# Patient Record
Sex: MALE | Race: WHITE | NOT HISPANIC OR LATINO | URBAN - METROPOLITAN AREA
[De-identification: names, ages, dates, MRNs, and addresses within clinical notes are randomized per-mention and may not be internally consistent; named-entity substitution may affect disease eponyms.]

---

## 2022-12-09 ENCOUNTER — TELEPHONE ENCOUNTER (OUTPATIENT)
Dept: URBAN - METROPOLITAN AREA CLINIC 27 | Facility: CLINIC | Age: 37
End: 2022-12-09

## 2022-12-09 ENCOUNTER — OFFICE VISIT (OUTPATIENT)
Dept: URBAN - METROPOLITAN AREA CLINIC 27 | Facility: CLINIC | Age: 37
End: 2022-12-09
Payer: COMMERCIAL

## 2022-12-09 ENCOUNTER — WEB ENCOUNTER (OUTPATIENT)
Dept: URBAN - METROPOLITAN AREA CLINIC 27 | Facility: CLINIC | Age: 37
End: 2022-12-09

## 2022-12-09 VITALS
BODY MASS INDEX: 22.35 KG/M2 | WEIGHT: 165 LBS | SYSTOLIC BLOOD PRESSURE: 131 MMHG | HEIGHT: 72 IN | DIASTOLIC BLOOD PRESSURE: 81 MMHG | RESPIRATION RATE: 17 BRPM | HEART RATE: 81 BPM

## 2022-12-09 DIAGNOSIS — K92.1 HEMATOCHEZIA: ICD-10-CM

## 2022-12-09 PROCEDURE — 99204 OFFICE O/P NEW MOD 45 MIN: CPT | Performed by: INTERNAL MEDICINE

## 2022-12-09 RX ORDER — SODIUM, POTASSIUM,MAG SULFATES 17.5-3.13G
354 ML SOLUTION, RECONSTITUTED, ORAL ORAL
OUTPATIENT
Start: 2022-12-09

## 2022-12-09 RX ORDER — SODIUM, POTASSIUM,MAG SULFATES 17.5-3.13G
354 ML AS DIRECTED FOR COLONOSCOPY SOLUTION, RECONSTITUTED, ORAL ORAL ONCE
Qty: 354 ML | Refills: 0
Start: 2022-12-09 | End: 2022-12-10

## 2022-12-09 NOTE — HPI-TODAY'S VISIT:
Mr. Stone is a 37-year old who presents with rectal bleeding.  He states that he has had rectal bleeding for 1 month.  He states that has about 10 loose stools a day with bright red blood associated.   He has nocturnal symptoms, having t wake up 3 times at night.  He has urgency and intermittent cramping.  He does not have pain with food.  He has no fevers or chills.   He has no family history of diarrheal illnesses.

## 2022-12-12 ENCOUNTER — WEB ENCOUNTER (OUTPATIENT)
Dept: URBAN - METROPOLITAN AREA CLINIC 27 | Facility: CLINIC | Age: 37
End: 2022-12-12

## 2022-12-13 ENCOUNTER — WEB ENCOUNTER (OUTPATIENT)
Dept: URBAN - METROPOLITAN AREA SURGERY CENTER 7 | Facility: SURGERY CENTER | Age: 37
End: 2022-12-13

## 2022-12-15 ENCOUNTER — CLAIMS CREATED FROM THE CLAIM WINDOW (OUTPATIENT)
Dept: URBAN - METROPOLITAN AREA SURGERY CENTER 7 | Facility: SURGERY CENTER | Age: 37
End: 2022-12-15
Payer: COMMERCIAL

## 2022-12-15 ENCOUNTER — CLAIMS CREATED FROM THE CLAIM WINDOW (OUTPATIENT)
Dept: URBAN - METROPOLITAN AREA SURGERY CENTER 7 | Facility: SURGERY CENTER | Age: 37
End: 2022-12-15

## 2022-12-15 ENCOUNTER — TELEPHONE ENCOUNTER (OUTPATIENT)
Dept: URBAN - METROPOLITAN AREA CLINIC 27 | Facility: CLINIC | Age: 37
End: 2022-12-15

## 2022-12-15 ENCOUNTER — LAB OUTSIDE AN ENCOUNTER (OUTPATIENT)
Dept: URBAN - METROPOLITAN AREA CLINIC 27 | Facility: CLINIC | Age: 37
End: 2022-12-15

## 2022-12-15 ENCOUNTER — CLAIMS CREATED FROM THE CLAIM WINDOW (OUTPATIENT)
Dept: URBAN - METROPOLITAN AREA CLINIC 4 | Facility: CLINIC | Age: 37
End: 2022-12-15
Payer: COMMERCIAL

## 2022-12-15 DIAGNOSIS — K63.89 BACTERIAL OVERGROWTH SYNDROME: ICD-10-CM

## 2022-12-15 DIAGNOSIS — K52.3 COLITIS, INDETERMINATE: ICD-10-CM

## 2022-12-15 DIAGNOSIS — K63.89 OTHER SPECIFIED DISEASES OF INTESTINE: ICD-10-CM

## 2022-12-15 DIAGNOSIS — K62.5 ANAL BLEEDING: ICD-10-CM

## 2022-12-15 DIAGNOSIS — R19.7 ACUTE DIARRHEA: ICD-10-CM

## 2022-12-15 PROCEDURE — 45380 COLONOSCOPY AND BIOPSY: CPT | Performed by: INTERNAL MEDICINE

## 2022-12-15 PROCEDURE — 88305 TISSUE EXAM BY PATHOLOGIST: CPT | Performed by: PATHOLOGY

## 2022-12-15 PROCEDURE — G8907 PT DOC NO EVENTS ON DISCHARG: HCPCS | Performed by: INTERNAL MEDICINE

## 2022-12-15 RX ORDER — PREDNISONE 10 MG/1
4 TABLETS FOR 7 DAYS, THEN 3 TABLETS FOR 7 DAYS, THEN 2 TABLETS FOR 7 DAYS, THEN 1 TABLET FOR 7 DAYS, THEN 1/2 HALF FOR 7 DAYS TABLET ORAL ONCE A DAY
Qty: 74 | Refills: 0 | OUTPATIENT
Start: 2022-12-15 | End: 2023-01-19

## 2022-12-16 ENCOUNTER — LAB OUTSIDE AN ENCOUNTER (OUTPATIENT)
Dept: URBAN - METROPOLITAN AREA CLINIC 27 | Facility: CLINIC | Age: 37
End: 2022-12-16

## 2022-12-18 LAB
COMMENT: (no result)
EXTRA LAVENDER-TOP TUBE: (no result)

## 2022-12-22 LAB
ANCA SCREEN: NEGATIVE
MYELOPEROXIDASE ANTIBODY: <1
PROTEINASE-3 ANTIBODY: <1
SACCHAROMYCES CEREVISIAE AB (ASCA) (IGA): 7.2
SACCHAROMYCES CEREVISIAE AB (ASCA) (IGG): 5.1

## 2022-12-29 LAB
HEPATITIS B CORE AB TOTAL: (no result)
HEPATITIS B SURFACE ANTIBODY QL: REACTIVE
HEPATITIS B SURFACE ANTIGEN: (no result)
MITOGEN-NIL: >10
NIL: 0.03
QUANTIFERON(R)-TB GOLD: NEGATIVE
TB1-NIL: 0.01
TB2-NIL: 0

## 2023-02-06 ENCOUNTER — OFFICE VISIT (OUTPATIENT)
Dept: URBAN - METROPOLITAN AREA CLINIC 27 | Facility: CLINIC | Age: 38
End: 2023-02-06
Payer: COMMERCIAL

## 2023-02-06 VITALS
WEIGHT: 182 LBS | HEART RATE: 81 BPM | SYSTOLIC BLOOD PRESSURE: 133 MMHG | HEIGHT: 72 IN | BODY MASS INDEX: 24.65 KG/M2 | DIASTOLIC BLOOD PRESSURE: 95 MMHG

## 2023-02-06 DIAGNOSIS — K50.10 CROHN'S DISEASE OF LARGE INTESTINE WITHOUT COMPLICATION: ICD-10-CM

## 2023-02-06 PROCEDURE — 99213 OFFICE O/P EST LOW 20 MIN: CPT | Performed by: INTERNAL MEDICINE

## 2023-02-06 RX ORDER — MESALAMINE 1.2 G/1
2 TABLETS WITH A MEAL TABLET, DELAYED RELEASE ORAL ONCE A DAY
Qty: 60 | OUTPATIENT
Start: 2023-02-06 | End: 2023-03-08

## 2023-02-06 NOTE — HPI-TODAY'S VISIT:
Bertha Ferguson  is a 37-year-old man who presents with bloody diarrhea was found to have severe colitis consistent with crohn's here for follow up.  He finished his prednisone taper 4 weeks ago and responded well.  He doesn't have diarrhea currently has 12 formed stool a day non-bloody.  Improved from 10 or more.  He states that he has noticed canker sores and arthritis returning.

## 2023-02-13 ENCOUNTER — WEB ENCOUNTER (OUTPATIENT)
Dept: URBAN - METROPOLITAN AREA CLINIC 27 | Facility: CLINIC | Age: 38
End: 2023-02-13

## 2023-02-16 PROBLEM — 7620006: Status: ACTIVE | Noted: 2023-02-06

## 2023-02-28 ENCOUNTER — TELEPHONE ENCOUNTER (OUTPATIENT)
Dept: URBAN - METROPOLITAN AREA CLINIC 98 | Facility: CLINIC | Age: 38
End: 2023-02-28

## 2023-03-06 ENCOUNTER — TELEPHONE ENCOUNTER (OUTPATIENT)
Dept: URBAN - METROPOLITAN AREA CLINIC 27 | Facility: CLINIC | Age: 38
End: 2023-03-06

## 2023-03-06 ENCOUNTER — OFFICE VISIT (OUTPATIENT)
Dept: URBAN - METROPOLITAN AREA CLINIC 96 | Facility: CLINIC | Age: 38
End: 2023-03-06
Payer: COMMERCIAL

## 2023-03-06 VITALS
BODY MASS INDEX: 24.43 KG/M2 | DIASTOLIC BLOOD PRESSURE: 89 MMHG | WEIGHT: 180.4 LBS | TEMPERATURE: 96.2 F | SYSTOLIC BLOOD PRESSURE: 132 MMHG | HEIGHT: 72 IN

## 2023-03-06 DIAGNOSIS — K12.0 CANKER SORE: ICD-10-CM

## 2023-03-06 DIAGNOSIS — K52.9 INFLAMMATORY BOWEL DISEASE: ICD-10-CM

## 2023-03-06 DIAGNOSIS — M25.50 POLYARTHRALGIA: ICD-10-CM

## 2023-03-06 DIAGNOSIS — Z51.81 THERAPEUTIC DRUG MONITORING: ICD-10-CM

## 2023-03-06 PROCEDURE — 99215 OFFICE O/P EST HI 40 MIN: CPT | Performed by: INTERNAL MEDICINE

## 2023-03-06 RX ORDER — MESALAMINE 1.2 G/1
2 TABLETS WITH A MEAL TABLET, DELAYED RELEASE ORAL ONCE A DAY
Qty: 60 | Refills: 11
Start: 2023-02-06 | End: 2023-04-06

## 2023-03-06 NOTE — HPI-TODAY'S VISIT:
37 y.o. WM Here for 2nd opinion Last June -- ankle, knee, shoulder pains -- all started at once Knee MD and podiatriast couldn't figure it out End of Nov - started w/ bloody diarrhea for -- few times at night, 10 times in the day - saw Dr. Celis - very helpful Did c-scope next week after seeing her in office Started on Pred x 1 month - taper - felt really good Ankle and knee pain is back; lots of sores in mouth Foster rec Humira or Chi Wonders about medicine Generally bloated - always been a thing Blood in stool is gone Going now just 2 / day

## 2023-03-08 ENCOUNTER — LAB OUTSIDE AN ENCOUNTER (OUTPATIENT)
Dept: URBAN - METROPOLITAN AREA CLINIC 96 | Facility: CLINIC | Age: 38
End: 2023-03-08

## 2023-03-08 ENCOUNTER — WEB ENCOUNTER (OUTPATIENT)
Dept: URBAN - METROPOLITAN AREA CLINIC 92 | Facility: CLINIC | Age: 38
End: 2023-03-08

## 2023-03-08 LAB
CRP: 0.43
HEP B SURF AG: (no result)
PERFORMING LAB: (no result)
PRICE - MISC: (no result)

## 2023-03-08 RX ORDER — MESALAMINE 1.2 G/1
2 TABLETS WITH A MEAL TABLET, DELAYED RELEASE ORAL ONCE A DAY
Qty: 60 | Refills: 1

## 2023-03-10 ENCOUNTER — WEB ENCOUNTER (OUTPATIENT)
Dept: URBAN - METROPOLITAN AREA CLINIC 92 | Facility: CLINIC | Age: 38
End: 2023-03-10

## 2023-03-10 LAB
Lab: (no result)
PERFORMING LAB: (no result)

## 2023-03-10 RX ORDER — PREDNISONE 20 MG/1
2 TABLETS WITH FOOD OR MILK TABLET ORAL ONCE A DAY
Qty: 10 TABLET | Refills: 0 | OUTPATIENT

## 2023-03-12 LAB
PERFORMING LAB: (no result)
QUANTIFERON-TB PLUS, 1T: (no result)

## 2023-03-13 ENCOUNTER — TELEPHONE ENCOUNTER (OUTPATIENT)
Dept: URBAN - METROPOLITAN AREA CLINIC 98 | Facility: CLINIC | Age: 38
End: 2023-03-13

## 2023-03-13 ENCOUNTER — WEB ENCOUNTER (OUTPATIENT)
Dept: URBAN - METROPOLITAN AREA CLINIC 35 | Facility: CLINIC | Age: 38
End: 2023-03-13

## 2023-03-13 PROBLEM — 71833008: Status: ACTIVE | Noted: 2023-03-13

## 2023-03-13 RX ORDER — ADALIMUMAB 40MG/0.4ML
0.4 ML KIT SUBCUTANEOUS EVERY OTHER WEEK
Qty: 6 PEN NEEDLE | Refills: 3 | OUTPATIENT
Start: 2023-03-13 | End: 2024-02-11

## 2023-03-13 RX ORDER — PREDNISONE 20 MG/1
2 TABLETS WITH FOOD OR MILK TABLET ORAL ONCE A DAY
Qty: 10 TABLET | Refills: 0 | Status: ACTIVE | COMMUNITY

## 2023-03-13 RX ORDER — MESALAMINE 1.2 G/1
2 TABLETS WITH A MEAL TABLET, DELAYED RELEASE ORAL ONCE A DAY
Qty: 60 | Refills: 1 | Status: ACTIVE | COMMUNITY

## 2023-03-13 RX ORDER — ADALIMUMAB 80MG/0.8ML
STARTER KIT, AS DIRECTED KIT SUBCUTANEOUS
Qty: 3 | Refills: 0 | OUTPATIENT
Start: 2023-03-13

## 2023-03-15 ENCOUNTER — TELEPHONE ENCOUNTER (OUTPATIENT)
Dept: URBAN - METROPOLITAN AREA CLINIC 35 | Facility: CLINIC | Age: 38
End: 2023-03-15

## 2023-03-17 ENCOUNTER — TELEPHONE ENCOUNTER (OUTPATIENT)
Dept: URBAN - METROPOLITAN AREA CLINIC 29 | Facility: CLINIC | Age: 38
End: 2023-03-17

## 2023-03-28 ENCOUNTER — TELEPHONE ENCOUNTER (OUTPATIENT)
Dept: URBAN - METROPOLITAN AREA CLINIC 29 | Facility: CLINIC | Age: 38
End: 2023-03-28

## 2023-04-06 ENCOUNTER — WEB ENCOUNTER (OUTPATIENT)
Dept: URBAN - METROPOLITAN AREA CLINIC 35 | Facility: CLINIC | Age: 38
End: 2023-04-06

## 2023-04-06 RX ORDER — ADALIMUMAB 40MG/0.4ML
AS DIRECTED KIT SUBCUTANEOUS EVERY OTHER WEEK
Qty: 6 | Refills: 3 | OUTPATIENT
Start: 2023-04-06

## 2024-02-01 ENCOUNTER — LAB (OUTPATIENT)
Dept: URBAN - METROPOLITAN AREA CLINIC 50 | Facility: CLINIC | Age: 39
End: 2024-02-01

## 2024-02-01 ENCOUNTER — OV EP (OUTPATIENT)
Dept: URBAN - METROPOLITAN AREA CLINIC 50 | Facility: CLINIC | Age: 39
End: 2024-02-01
Payer: COMMERCIAL

## 2024-02-01 VITALS
DIASTOLIC BLOOD PRESSURE: 83 MMHG | BODY MASS INDEX: 23.7 KG/M2 | WEIGHT: 175 LBS | TEMPERATURE: 99.2 F | SYSTOLIC BLOOD PRESSURE: 122 MMHG | HEIGHT: 72 IN | HEART RATE: 84 BPM

## 2024-02-01 DIAGNOSIS — D84.9 IMMUNOSUPPRESSION: ICD-10-CM

## 2024-02-01 DIAGNOSIS — R19.7 ACUTE DIARRHEA: ICD-10-CM

## 2024-02-01 DIAGNOSIS — Z51.81 THERAPEUTIC DRUG MONITORING: ICD-10-CM

## 2024-02-01 DIAGNOSIS — K50.10 CROHN'S DISEASE OF LARGE INTESTINE WITHOUT COMPLICATION: ICD-10-CM

## 2024-02-01 PROBLEM — 305058001: Status: ACTIVE | Noted: 2024-02-01

## 2024-02-01 PROBLEM — 38013005: Status: ACTIVE | Noted: 2024-02-01

## 2024-02-01 PROBLEM — 62315008: Status: ACTIVE | Noted: 2024-02-01

## 2024-02-01 PROBLEM — 113521000119108: Status: ACTIVE | Noted: 2024-02-01

## 2024-02-01 PROBLEM — 24526004: Status: ACTIVE | Noted: 2024-02-01

## 2024-02-01 PROCEDURE — 99214 OFFICE O/P EST MOD 30 MIN: CPT | Performed by: PHYSICIAN ASSISTANT

## 2024-02-01 RX ORDER — BUDESONIDE 9 MG/1
1 TABLET IN THE MORNING TABLET, FILM COATED, EXTENDED RELEASE ORAL ONCE A DAY
Qty: 30 | Refills: 1 | OUTPATIENT
Start: 2024-02-01 | End: 2024-04-01

## 2024-02-01 RX ORDER — MESALAMINE 1.2 G/1
2 TABLETS WITH A MEAL TABLET, DELAYED RELEASE ORAL ONCE A DAY
Qty: 60 | Refills: 1 | Status: DISCONTINUED | COMMUNITY

## 2024-02-01 RX ORDER — PREDNISONE 20 MG/1
2 TABLETS TABLET ORAL ONCE A DAY
Qty: 10 TABLET | Refills: 0 | OUTPATIENT
Start: 2024-02-01 | End: 2024-02-06

## 2024-02-01 RX ORDER — ADALIMUMAB 40MG/0.4ML
0.4 ML KIT SUBCUTANEOUS EVERY OTHER WEEK
Qty: 6 PEN NEEDLE | Refills: 3 | Status: ACTIVE | COMMUNITY

## 2024-02-01 RX ORDER — PREDNISONE 20 MG/1
2 TABLETS WITH FOOD OR MILK TABLET ORAL ONCE A DAY
Qty: 10 TABLET | Refills: 0 | Status: DISCONTINUED | COMMUNITY

## 2024-02-01 RX ORDER — SODIUM, POTASSIUM,MAG SULFATES 17.5-3.13G
177ML SOLUTION, RECONSTITUTED, ORAL ORAL
Qty: 1 | Refills: 0 | OUTPATIENT
Start: 2024-02-01 | End: 2024-02-02

## 2024-02-01 NOTE — HPI-TODAY'S VISIT:
2/1/24: 39 y/o M here for f/u IBD, on humira States about 1-2 months ago, started w increasing joint/body aches, stomach discomfort/cramping, worse in past week, nausea Starting monday, diarrhea, no blood presently, BMs 6-10x/day this week, worse at nighttime Appetite lower last couple days, nausea, maybe indigestion, hard to describe No weight loss noted BMs were about 1-2x/day prior to onset of syx, no joint pains/rashes Continues use of humira, 40mg [id number], not missing any doses lately No recent abx use INcreasing body achiness/joint pains, itching withotu rash on UE -- 3/6/23: 37 y.o. WM Here for 2nd opinion Last June -- ankle, knee, shoulder pains -- all started at once Knee MD and podiatriast couldn't figure it out End of Nov - started w/ bloody diarrhea for -- few times at night, 10 times in the day - saw Dr. Celis - very helpful Did c-scope next week after seeing her in office Started on Pred x 1 month - taper - felt really good Ankle and knee pain is back; lots of sores in mouth Foster rec Humira or Chi Wonders about medicine Generally bloated - always been a thing Blood in stool is gone Going now just 2 / day

## 2024-02-01 NOTE — PHYSICAL EXAM GASTROINTESTINAL
Abdomen , soft, generalized mild abd tender, mildly distended , no guarding or rigidity , no masses palpable , normal bowel sounds , Liver and Spleen,  no hepatosplenomegaly , liver nontender.

## 2024-09-10 ENCOUNTER — WEB ENCOUNTER (OUTPATIENT)
Dept: URBAN - METROPOLITAN AREA CLINIC 96 | Facility: CLINIC | Age: 39
End: 2024-09-10

## 2024-12-30 ENCOUNTER — WEB ENCOUNTER (OUTPATIENT)
Dept: URBAN - METROPOLITAN AREA CLINIC 96 | Facility: CLINIC | Age: 39
End: 2024-12-30

## 2024-12-30 RX ORDER — DICYCLOMINE HYDROCHLORIDE 10 MG/1
1 CAPSULES CAPSULE ORAL
Qty: 30 | Refills: 1 | OUTPATIENT
Start: 2024-12-30 | End: 2025-02-28

## 2024-12-30 RX ORDER — PREDNISONE 20 MG/1
1 TABLET TABLET ORAL ONCE A DAY
Qty: 5 TABLET | Refills: 0 | OUTPATIENT
Start: 2024-12-30 | End: 2025-01-04

## 2025-01-20 ENCOUNTER — WEB ENCOUNTER (OUTPATIENT)
Dept: URBAN - METROPOLITAN AREA CLINIC 96 | Facility: CLINIC | Age: 40
End: 2025-01-20

## 2025-01-21 ENCOUNTER — DASHBOARD ENCOUNTERS (OUTPATIENT)
Age: 40
End: 2025-01-21

## 2025-01-21 ENCOUNTER — WEB ENCOUNTER (OUTPATIENT)
Dept: URBAN - METROPOLITAN AREA CLINIC 96 | Facility: CLINIC | Age: 40
End: 2025-01-21

## 2025-01-21 ENCOUNTER — TELEPHONE ENCOUNTER (OUTPATIENT)
Dept: URBAN - METROPOLITAN AREA CLINIC 50 | Facility: CLINIC | Age: 40
End: 2025-01-21

## 2025-01-21 ENCOUNTER — OFFICE VISIT (OUTPATIENT)
Dept: URBAN - METROPOLITAN AREA TELEHEALTH 2 | Facility: TELEHEALTH | Age: 40
End: 2025-01-21
Payer: COMMERCIAL

## 2025-01-21 ENCOUNTER — LAB OUTSIDE AN ENCOUNTER (OUTPATIENT)
Dept: URBAN - METROPOLITAN AREA TELEHEALTH 2 | Facility: TELEHEALTH | Age: 40
End: 2025-01-21

## 2025-01-21 VITALS — WEIGHT: 170 LBS | BODY MASS INDEX: 23.03 KG/M2 | HEIGHT: 72 IN

## 2025-01-21 DIAGNOSIS — Z79.620 ADALIMUMAB LONG-TERM USE: ICD-10-CM

## 2025-01-21 DIAGNOSIS — R82.90 URINE ABNORMALITY: ICD-10-CM

## 2025-01-21 DIAGNOSIS — R10.84 GENERALIZED ABDOMINAL CRAMPING: ICD-10-CM

## 2025-01-21 DIAGNOSIS — K62.89 RECTAL PAIN: ICD-10-CM

## 2025-01-21 DIAGNOSIS — R39.198 ABNORMAL URINATION: ICD-10-CM

## 2025-01-21 DIAGNOSIS — Z51.81 THERAPEUTIC DRUG MONITORING: ICD-10-CM

## 2025-01-21 DIAGNOSIS — R19.5 MUCUS IN STOOL: ICD-10-CM

## 2025-01-21 DIAGNOSIS — K50.10 CROHN'S DISEASE OF LARGE INTESTINE WITHOUT COMPLICATION: ICD-10-CM

## 2025-01-21 PROCEDURE — 99214 OFFICE O/P EST MOD 30 MIN: CPT | Performed by: PHYSICIAN ASSISTANT

## 2025-01-21 RX ORDER — ADALIMUMAB 40MG/0.4ML
INJECT 40 MG KIT SUBCUTANEOUS EVERY OTHER WEEK
Qty: 6 PEN NEEDLE | Refills: 3 | Status: ACTIVE | COMMUNITY

## 2025-01-21 RX ORDER — DICYCLOMINE HYDROCHLORIDE 10 MG/1
1 CAPSULES CAPSULE ORAL
Qty: 30 | Refills: 1 | Status: ACTIVE | COMMUNITY

## 2025-01-21 NOTE — HPI-TODAY'S VISIT:
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 Provided percocent, dilaudid, phenergan to help, also given dose solu-medrol States every 2-3 wks for past 6 months, will wake up from sleep w "constricted" feeling in rectum throughout abdomen, feeling like can't quite urinate or defecate with cramping/nausea Recent syx felt very simliar but way worse than typical and lasted longer w intolerable pains Still w some abd pains today, no nauesa Felt better last night generally about an hour after steroid administration BMs 1-2x/day, will get incr mucus w syx episodes including last day or so Appetite generally good, but poor appetite w pains, no abnormal wt loss No joint pains or rashes Continues humira q2wks, no skipped doses lately Admits hx kidney stones, but pains feel different from this Will feel urine urgency w fecal urge w syx as described above w episodes Sent home w prednisone, tramdol, zofran, hasn't started yet Not sure if antispasm much helpfu at htis point -- 2/1/24: 37 y/o M here for f/u IBD, on humira States about 1-2 months ago, started w increasing joint/body aches, stomach discomfort/cramping, worse in past week, nausea Starting monday, diarrhea, no blood presently, BMs 6-10x/day this week, worse at nighttime Appetite lower last couple days, nausea, maybe indigestion, hard to describe No weight loss noted BMs were about 1-2x/day prior to onset of syx, no joint pains/rashes Continues use of humira, 40mg [id number], not missing any doses lately No recent abx use INcreasing body achiness/joint pains, itching withotu rash on UE -- 3/6/23: 37 y.o. WM Here for 2nd opinion Last June -- ankle, knee, shoulder pains -- all started at once Knee MD and podiatriast couldn't figure it out End of Nov - started w/ bloody diarrhea for -- few times at night, 10 times in the day - saw Dr. Celis - very helpful Did c-scope next week after seeing her in office Started on Pred x 1 month - taper - felt really good Ankle and knee pain is back; lots of sores in mouth Foster rec Humira or Chi Wonders about medicine Generally bloated - always been a thing Blood in stool is gone Going now just 2 / day

## 2025-01-22 ENCOUNTER — TELEPHONE ENCOUNTER (OUTPATIENT)
Dept: URBAN - METROPOLITAN AREA CLINIC 50 | Facility: CLINIC | Age: 40
End: 2025-01-22

## 2025-01-22 ENCOUNTER — WEB ENCOUNTER (OUTPATIENT)
Dept: URBAN - METROPOLITAN AREA CLINIC 50 | Facility: CLINIC | Age: 40
End: 2025-01-22

## 2025-01-27 ENCOUNTER — OFFICE VISIT (OUTPATIENT)
Dept: URBAN - METROPOLITAN AREA CLINIC 96 | Facility: CLINIC | Age: 40
End: 2025-01-27

## 2025-02-03 ENCOUNTER — LAB OUTSIDE AN ENCOUNTER (OUTPATIENT)
Dept: URBAN - METROPOLITAN AREA CLINIC 50 | Facility: CLINIC | Age: 40
End: 2025-02-03

## 2025-02-04 ENCOUNTER — WEB ENCOUNTER (OUTPATIENT)
Dept: URBAN - METROPOLITAN AREA CLINIC 50 | Facility: CLINIC | Age: 40
End: 2025-02-04

## 2025-02-07 LAB
A/G RATIO: 2.1
ABSOLUTE BASOPHILS: 41
ABSOLUTE EOSINOPHILS: 73
ABSOLUTE LYMPHOCYTES: 2535
ABSOLUTE MONOCYTES: 437
ABSOLUTE NEUTROPHILS: 5014
ADALIMUMAB AB, IBD: <10
ADALIMUMAB LEVEL, IBD: 15.5
ALBUMIN: 4.9
ALKALINE PHOSPHATASE: 48
ALT (SGPT): 22
APPEARANCE: CLEAR
AST (SGOT): 17
BACTERIA: (no result)
BASOPHILS: 0.5
BILIRUBIN, TOTAL: 1.9
BILIRUBIN: NEGATIVE
BUN/CREATININE RATIO: (no result)
BUN: 17
C-REACTIVE PROTEIN, QUANT: <3
CALCIUM: 10.1
CARBON DIOXIDE, TOTAL: 27
CHLORIDE: 102
CHOL/HDLC RATIO: 4.3
CHOLESTEROL, TOTAL: 240
COMMENT: (no result)
COMMENTS: (no result)
CREATININE: 1.14
CULTURE: (no result)
EGFR: 84
EOSINOPHILS: 0.9
FERRITIN, SERUM: 118
FOLATE, SERUM: 19.5
GLOBULIN, TOTAL: 2.3
GLUCOSE: 85
GLUCOSE: NEGATIVE
HBSAG SCREEN: (no result)
HDL CHOLESTEROL: 56
HEMATOCRIT: 41.9
HEMOGLOBIN: 14.6
HEP A AB, IGM: (no result)
HEP B CORE AB, IGM: (no result)
HEPATITIS C ANTIBODY: (no result)
HYALINE CAST: (no result)
INTERPRETATION: (no result)
KETONES: NEGATIVE
LDL-CHOLESTEROL: 149
LYMPHOCYTES: 31.3
MCH: 30.9
MCHC: 34.8
MCV: 88.8
MITOGEN-NIL: >10
MONOCYTES: 5.4
MPV: 10.4
NEUTROPHILS: 61.9
NITRITE, URINE: NEGATIVE
NON HDL CHOLESTEROL: 184
OCCULT BLOOD: NEGATIVE
PH: 5.5
PLATELET COUNT: 260
POTASSIUM: 3.9
PROTEIN, TOTAL: 7.2
PROTEIN: (no result)
QUANTIFERON NIL VALUE: 0.02
QUANTIFERON TB1 AG VALUE: <0
QUANTIFERON TB2 AG VALUE: 0
QUANTIFERON-TB GOLD PLUS: NEGATIVE
RBC: (no result)
RDW: 12.1
RED BLOOD CELL COUNT: 4.72
SODIUM: 139
SPECIFIC GRAVITY: 1.03
SQUAMOUS EPITHELIAL CELLS: (no result)
TRIGLYCERIDES: 208
TSH: 1.46
URINE-COLOR: (no result)
VITAMIN B12: 490
VITAMIN D,25-OH,TOTAL,IA: 25
WBC ESTERASE: NEGATIVE
WBC: (no result)
WHITE BLOOD CELL COUNT: 8.1

## 2025-02-26 ENCOUNTER — WEB ENCOUNTER (OUTPATIENT)
Dept: URBAN - METROPOLITAN AREA CLINIC 50 | Facility: CLINIC | Age: 40
End: 2025-02-26

## 2025-03-03 ENCOUNTER — CLAIMS CREATED FROM THE CLAIM WINDOW (OUTPATIENT)
Dept: URBAN - METROPOLITAN AREA CLINIC 4 | Facility: CLINIC | Age: 40
End: 2025-03-03
Payer: COMMERCIAL

## 2025-03-03 ENCOUNTER — CLAIMS CREATED FROM THE CLAIM WINDOW (OUTPATIENT)
Dept: URBAN - METROPOLITAN AREA SURGERY CENTER 18 | Facility: SURGERY CENTER | Age: 40
End: 2025-03-03
Payer: COMMERCIAL

## 2025-03-03 DIAGNOSIS — K50.90 CROHN DISEASE: ICD-10-CM

## 2025-03-03 DIAGNOSIS — K50.10 CC (CROHN'S COLITIS): ICD-10-CM

## 2025-03-03 DIAGNOSIS — K63.89 OTHER SPECIFIED DISEASES OF INTESTINE: ICD-10-CM

## 2025-03-03 PROCEDURE — 00811 ANES LWR INTST NDSC NOS: CPT | Performed by: NURSE ANESTHETIST, CERTIFIED REGISTERED

## 2025-03-03 PROCEDURE — 88305 TISSUE EXAM BY PATHOLOGIST: CPT | Performed by: PATHOLOGY

## 2025-03-03 PROCEDURE — 45380 COLONOSCOPY AND BIOPSY: CPT | Performed by: INTERNAL MEDICINE

## 2025-03-03 RX ORDER — ADALIMUMAB 40MG/0.4ML
INJECT 40 MG KIT SUBCUTANEOUS EVERY OTHER WEEK
Qty: 6 PEN NEEDLE | Refills: 3 | Status: ACTIVE | COMMUNITY

## 2025-03-03 RX ORDER — DICYCLOMINE HYDROCHLORIDE 10 MG/1
1 CAPSULES CAPSULE ORAL
Qty: 30 | Refills: 1 | Status: ACTIVE | COMMUNITY

## 2025-03-18 ENCOUNTER — WEB ENCOUNTER (OUTPATIENT)
Dept: URBAN - METROPOLITAN AREA CLINIC 98 | Facility: CLINIC | Age: 40
End: 2025-03-18

## 2025-03-18 RX ORDER — ADALIMUMAB 40MG/0.4ML
INJECT 40 MG KIT SUBCUTANEOUS EVERY OTHER WEEK
Qty: 6 PEN NEEDLE | Refills: 3
End: 2026-02-21